# Patient Record
Sex: FEMALE | Race: BLACK OR AFRICAN AMERICAN | URBAN - METROPOLITAN AREA
[De-identification: names, ages, dates, MRNs, and addresses within clinical notes are randomized per-mention and may not be internally consistent; named-entity substitution may affect disease eponyms.]

---

## 2017-01-04 ENCOUNTER — PRENATAL OFFICE VISIT - HEALTHEAST (OUTPATIENT)
Dept: MIDWIFE SERVICES | Facility: CLINIC | Age: 32
End: 2017-01-04

## 2017-01-04 DIAGNOSIS — O99.820 GBS (GROUP B STREPTOCOCCUS CARRIER), +RV CULTURE, CURRENTLY PREGNANT: ICD-10-CM

## 2017-01-04 DIAGNOSIS — Z34.80 SUPERVISION OF OTHER NORMAL PREGNANCY, ANTEPARTUM: ICD-10-CM

## 2017-01-04 ASSESSMENT — MIFFLIN-ST. JEOR: SCORE: 1616.69

## 2017-01-11 ENCOUNTER — PRENATAL OFFICE VISIT - HEALTHEAST (OUTPATIENT)
Dept: MIDWIFE SERVICES | Facility: CLINIC | Age: 32
End: 2017-01-11

## 2017-01-11 DIAGNOSIS — Z34.80 SUPERVISION OF OTHER NORMAL PREGNANCY, ANTEPARTUM: ICD-10-CM

## 2017-01-11 DIAGNOSIS — O48.0 POST-TERM PREGNANCY, 40-42 WEEKS OF GESTATION: ICD-10-CM

## 2017-01-11 ASSESSMENT — MIFFLIN-ST. JEOR: SCORE: 1620.32

## 2017-01-13 ENCOUNTER — HOSPITAL ENCOUNTER (OUTPATIENT)
Dept: ADMINISTRATIVE | Facility: OTHER | Age: 32
Discharge: HOME | End: 2017-01-14
Attending: ADVANCED PRACTICE MIDWIFE | Admitting: ADVANCED PRACTICE MIDWIFE

## 2017-01-13 ENCOUNTER — COMMUNICATION - HEALTHEAST (OUTPATIENT)
Dept: OBGYN | Facility: CLINIC | Age: 32
End: 2017-01-13

## 2017-01-13 DIAGNOSIS — Z34.80 SUPERVISION OF OTHER NORMAL PREGNANCY, ANTEPARTUM: ICD-10-CM

## 2017-01-13 DIAGNOSIS — O48.0 POST-TERM PREGNANCY, 40-42 WEEKS OF GESTATION: ICD-10-CM

## 2017-01-14 ASSESSMENT — MIFFLIN-ST. JEOR: SCORE: 1746.41

## 2017-03-08 ENCOUNTER — OFFICE VISIT - HEALTHEAST (OUTPATIENT)
Dept: MIDWIFE SERVICES | Facility: CLINIC | Age: 32
End: 2017-03-08

## 2017-03-08 DIAGNOSIS — Z30.9 CONTRACEPTIVE MANAGEMENT: ICD-10-CM

## 2017-03-08 DIAGNOSIS — E66.3 OVERWEIGHT: ICD-10-CM

## 2017-03-08 ASSESSMENT — MIFFLIN-ST. JEOR: SCORE: 1619.41

## 2021-05-30 VITALS — HEIGHT: 65 IN | WEIGHT: 201.9 LBS | BODY MASS INDEX: 33.64 KG/M2

## 2021-05-30 VITALS — WEIGHT: 192 LBS | HEIGHT: 68 IN | BODY MASS INDEX: 29.1 KG/M2

## 2021-05-30 VITALS — WEIGHT: 220 LBS | HEIGHT: 68 IN | BODY MASS INDEX: 33.34 KG/M2

## 2021-05-30 VITALS — HEIGHT: 65 IN | BODY MASS INDEX: 33.77 KG/M2 | WEIGHT: 202.7 LBS

## 2021-06-08 NOTE — PROGRESS NOTES
Returned ambulatory from [facility]  Verbal report from technician was a BPP of 8/8.  CNM notified and wants to review report before discharging pt.  Pt getting dressed.

## 2021-06-08 NOTE — PROGRESS NOTES
Discharge Instructions given with  from [facility] named Mani.  Pt thinks she will be back tonight.  Pt agreed to call the midwife before coming in if at all possible and to increase her fluid intake above what she is drinking now.  If she does not go into labor, pt will keep her appointment on Wednesday.  Discharged ambulatory with cousin .

## 2021-06-08 NOTE — PROGRESS NOTES
"Outpatient/Triage Note:    Patient Name:  Harjeet Tierney  :      1985  MRN:      [id number]      Assessment:   @ 41w0d here for evaluation of contactions  FHT Category 1  GBS positive  False labor     Plan:   -BPP completed prior to discharge, , vertex ABI 7.1  - Discharge to home undelivered.   -Reviewed warning signs including decreased fetal movement, leaking of fluid, vaginal bleeding, or signs of labor.   -Reviewed how to contact on-call CNM prior to presenting to hospital.   -Follow-up in clinic with CNM as scheduled or sooner as needed.   -All questions answered. Agrees with plan.     Subjective:  Harjeet Tierney is a 31 y.o.  at 41 weeks, with an EDC of --17 who presented to [facility] for evaluation of contractions that started at 0100.  States contractions started at every 15 minutes and increased to every 5 minutes during ride to hospital. Denies leaking of fluid, bleeding, or changes in fetal movement.     Allowed patient to ambulate for an hour and then offered re-evaluation of cervix.  Patient states that contractions have now stopped and she would like to go home until active labor begins.      Objective:  Vital signs:   Visit Vitals     /69     Pulse 92     Temp 98.2  F (36.8  C) (Oral)     Resp 18     Ht 5' 8\" (1.727 m)     Wt 220 lb (99.8 kg)     LMP 04/10/2016     SpO2 99%     BMI 33.45 kg/m2     FHR: Baseline 135, moderate variability, accelerations present, no decelerations  Uterine contractions: irregular, palpate moderate    Physical Exam:   Abdomen: SIUP, vertex by Leopold's, abdomen non-tender  SVE: 3cm/30%/-2/mid position/soft  Repeat exam deferred as patient states that contractions have now stopped.       Temp:  [98.2  F (36.8  C)] 98.2  F (36.8  C)  Heart Rate:  [92] 92  Resp:  [18] 18  BP: (109)/(69) 109/69  No intake or output data in the 24 hours ending 17 1129    Results:  Results for orders placed or performed in visit on 16   Group B Strep " Screen by PCR   Result Value Ref Range    Group B Strep Screen by PCR Positive (!) Negative    Allergic to Penicillin No    Wet prep, genital   Result Value Ref Range    Yeast Result Yeast Seen (!) No yeast seen    Trichomonas No Trichomonas seen No Trichomonas seen    Clue Cells, Wet Prep No Clue cells seen No Clue cells seen   Hemoglobin   Result Value Ref Range    Hemoglobin 12.6 12.0 - 16.0 g/dL     BPP 8/8, ABI 7.1, vertex      Provider:HUSSEIN Banerjee,OMEGA      TT with patient 40 min >50% time spent counseling.

## 2021-06-08 NOTE — PROGRESS NOTES
"Harjeet presents to the clinic today with professional .  Overall, feeling very good!  Vaginal cream for yeast vaginitis was helpful.  He is active, and she denies regular uterine contractions, loss of fluid or vaginal bleeding.  Appetite is good.  Total weight gain within normal limits.  She plans to have her sister \"Lamonte\" present for labor and birth.  Term labor precautions.  Encouraged daily fetal kick counts.  Reiterated GBS positive status and need to contact nurse midwife with regular uterine contractions or rupture of membranes in order to initiate intrapartum antibiotic prophylaxis.  All questions answered.  Encouraged to call or return to clinic with any questions, concerns, or as needed.  "

## 2021-06-08 NOTE — PROGRESS NOTES
Harjeet presents to the clinic alone.  Professional  utilized via telephone.  All is well!  Postdates pregnancy management/fetal surveillance reviewed.  Appointment made for biophysical profile at [facility] this Friday, January 13, 2017 at 3:15 PM to be followed by a nonstress test on [facility].  Baby is active, and she denies regular uterine contractions, loss of fluid or vaginal bleeding.  Daily fetal movement counting encouraged.  Term labor precautions and danger signs and symptoms reviewed.  All questions answered.  Encouraged to call or return to clinic with any questions, concerns, or as needed.    *Reiterated need to contact nurse midwife with regular uterine contractions or rupture of membranes in order to initiate intrapartum antibiotic prophylaxis.

## 2021-06-08 NOTE — PROGRESS NOTES
Pt asking to go home.  Says she will come back if contractions get closer and stronger.  CNM notified.  CNM ordered a BPP before discharge since pt did not come for the test yesterday.  .

## 2021-06-09 NOTE — PROGRESS NOTES
7-week Postpartum Visit:     Assessment:     1.  Postpartum 7 weeks status post normal spontaneous vaginal birth  2.  First-degree perineal laceration   3.  Left labial laceration  4.  Breast-feeding and formula/bottle feeding  5.  Contraceptive management  6.  Overweight    Plan:   -Reviewed warning signs of postpartum depression.   -PP exercises reviewed and encouraged modified abdominal crunches and Kegels daily. Encouraged integrating formal exercise, such as walking 20-30mns daily.   -Warning signs of breast infections of mastitis and thrush reviewed. Breastfeeding support resource list and contact info given, including [facility] and [facility].   -Encouraged to continue with PNV, Vitamin D and DHA supplements.   -Return of fertility discussed. [place] for contraception.   -Reviewed warning signs of pelvic pain, excessive bleeding or abdominal pain, fever/chills, or signs of breast infection.   -Prescription for prenatal vitamins sent to preferred pharmacy.  -RTC for routine health maintenance in 1 year or sooner as needed.     TT with patient 40 mns, >50% time spent in counseling or coordination of care.     Subjective:   Harjeet is a 30 yo, here for her 7 week postpartum exam. She is integrating birth experience/care and transition well. Bleeding and uterine cramping have ceased. Denies pain. Reports normal bowel and bladder functioning. EPDS score 0/30. Reports good family/friend support.  Breastfeeding well-established. Feeding q 2-3 hours.   Has not resumed intercourse. Denies pain or concerns. Plans to use abstinence ([place]  [place] currently[place] for contraception.     Review of Systems  Pertinent items are noted in HPI.      Objective:     Physical Exam:  General: Pleasant, articulate, well-groomed, well-nourished female.  Not in any apparent distress.  Neck: Supple.  Thyroid: Small,  symmetrical, no nodules noted.  Lymphadenopathy: Negative.  Lungs: Clear to auscultation bilaterally, and a nonlabored breathing pattern.  Cardio: Regular rate and rhythm, negative for murmur.   Breasts: Symmetrical, nontender, no masses, negative lymphadenopathy, negative nipple discharge.  Abdomen: Soft, nontender, no masses, negative CVAT.  External genitalia: Normal hair distribution, no lesions.  Urethral opening: Without lesions, or tenderness.   Bladder: Without masses, or tenderness.  Vagina: Nontender, no masses.  Cervix: Closed, thick and long without cervical motion tenderness.  Bimanual: Finds uterus small, well involuted, mobile, nontender, no masses.  Negative CMT.  Adnexa, without masses or tenderness.    Lower extremities: +1 reflexes, no significant edema.

## 2021-07-14 PROBLEM — O48.0 POST-TERM PREGNANCY, 40-42 WEEKS OF GESTATION: Status: RESOLVED | Noted: 2017-01-11 | Resolved: 2017-01-14

## 2021-07-14 PROBLEM — O47.9 FALSE LABOR: Status: RESOLVED | Noted: 2017-01-14 | Resolved: 2017-01-14

## 2021-07-14 PROBLEM — Z37.9 NORMAL LABOR: Status: RESOLVED | Noted: 2017-01-14 | Resolved: 2017-01-14

## 2021-07-14 PROBLEM — Z34.90 PREGNANT: Status: RESOLVED | Noted: 2017-01-14 | Resolved: 2017-01-14

## 2021-07-14 PROBLEM — O26.03 EXCESSIVE WEIGHT GAIN DURING PREGNANCY IN THIRD TRIMESTER: Status: RESOLVED | Noted: 2017-01-14 | Resolved: 2017-01-14
